# Patient Record
Sex: FEMALE | Race: WHITE | NOT HISPANIC OR LATINO | Employment: FULL TIME | URBAN - METROPOLITAN AREA
[De-identification: names, ages, dates, MRNs, and addresses within clinical notes are randomized per-mention and may not be internally consistent; named-entity substitution may affect disease eponyms.]

---

## 2024-10-16 ENCOUNTER — OFFICE VISIT (OUTPATIENT)
Dept: FAMILY MEDICINE CLINIC | Facility: CLINIC | Age: 34
End: 2024-10-16
Payer: COMMERCIAL

## 2024-10-16 VITALS
HEART RATE: 88 BPM | HEIGHT: 66 IN | DIASTOLIC BLOOD PRESSURE: 80 MMHG | BODY MASS INDEX: 30.53 KG/M2 | OXYGEN SATURATION: 97 % | WEIGHT: 190 LBS | SYSTOLIC BLOOD PRESSURE: 124 MMHG

## 2024-10-16 DIAGNOSIS — F41.9 ANXIETY: ICD-10-CM

## 2024-10-16 DIAGNOSIS — R53.82 CHRONIC FATIGUE: ICD-10-CM

## 2024-10-16 DIAGNOSIS — N92.6 IRREGULAR MENSTRUAL BLEEDING: ICD-10-CM

## 2024-10-16 DIAGNOSIS — E66.09 CLASS 1 OBESITY DUE TO EXCESS CALORIES WITHOUT SERIOUS COMORBIDITY WITH BODY MASS INDEX (BMI) OF 30.0 TO 30.9 IN ADULT: ICD-10-CM

## 2024-10-16 DIAGNOSIS — Z00.00 WELL ADULT EXAM: Primary | ICD-10-CM

## 2024-10-16 DIAGNOSIS — E66.811 CLASS 1 OBESITY DUE TO EXCESS CALORIES WITHOUT SERIOUS COMORBIDITY WITH BODY MASS INDEX (BMI) OF 30.0 TO 30.9 IN ADULT: ICD-10-CM

## 2024-10-16 DIAGNOSIS — Z11.59 ENCOUNTER FOR HEPATITIS C SCREENING TEST FOR LOW RISK PATIENT: ICD-10-CM

## 2024-10-16 PROCEDURE — 99385 PREV VISIT NEW AGE 18-39: CPT | Performed by: FAMILY MEDICINE

## 2024-10-16 PROCEDURE — 99213 OFFICE O/P EST LOW 20 MIN: CPT | Performed by: FAMILY MEDICINE

## 2024-10-16 RX ORDER — HYDROXYZINE PAMOATE 25 MG/1
25 CAPSULE ORAL
COMMUNITY
Start: 2024-09-07

## 2024-10-16 RX ORDER — HYDROXYZINE HYDROCHLORIDE 25 MG/ML
25 INJECTION, SOLUTION INTRAMUSCULAR
COMMUNITY

## 2024-10-16 NOTE — PATIENT INSTRUCTIONS
Recommend 30 minutes of moderate activity daily.  Recommend maintaining a balanced diet.  Goal of 4-6 bottles of water daily; be cautious of liquid calories if you are trying to watch calorie or carb intake.  Specifically alcohol intake.  Goal of limiting carbs to 60 g per meal and up to 20 g per snack; recommend small meal or snack every 3-4 hours.

## 2024-10-16 NOTE — PROGRESS NOTES
"Chief Complaint  Establish Care (Pt would like to speak about weight gain and tiredness)    Subjective      Jacqueline BROWN is a 34 y.o. female who presents to [facility] to establish care    Tobacco:1ppd, patient hesitant to reduce or quit use due to anxiety and weight.  Alcohol: Notes 2 drinks twice a week and occasionally more on the weekends, discussed this and the overall plan for lifestyle changes and weight loss.  Diet: Patient admits that diet is not great but she is planning to work on this.  Exercise: None currently, notes difficulty with motivation and fatigue.  Sexually active/STD screen: Declines screening, in monogamous polite relationship.  Open to hep C screening.  Mood: PHQ 2 score of 1, ROSEANN-7 score of 9.  Patient admits to anxiety for which she takes as needed hydroxyzine, not interested in daily maintenance medicine at this time.  Vaccines: Patient believes her tetanus was updated in the last couple years at outside facility.    Colon cancer screening: Not due to age    Cervical Cancer screening: Past due for screening, would prefer referral to gynecology due to recent spotting midcycle  Breast cancer screening: Not due to age  Osteoporosis screening/monitoring: Not due to age    Additional concerns today include continued weight gain and chronic fatigue.  Patient notes that this adds to her overall mood concerns and lack of motivation.      Objective   Vital Signs:   Vitals:    10/16/24 0916   BP: 124/80   Pulse: 88   SpO2: 97%   Weight: 86.2 kg (190 lb)   Height: 167.6 cm (66\")     Body mass index is 30.67 kg/m².    Wt Readings from Last 3 Encounters:   10/16/24 86.2 kg (190 lb)     BP Readings from Last 3 Encounters:   10/16/24 124/80       Health Maintenance   Topic Date Due    BMI FOLLOWUP  Never done    HEPATITIS C SCREENING  Never done    ANNUAL PHYSICAL  Never done    PAP SMEAR  Never done    Pneumococcal Vaccine 0-64 (1 of 2 - PCV) 10/22/2024 (Originally " 5/5/1994)    TDAP/TD VACCINES (1 - Tdap) 10/22/2024 (Originally 5/5/2007)    COVID-19 Vaccine (1 - 2023-24 season) 01/05/2025 (Originally 9/1/2024)    INFLUENZA VACCINE  03/31/2025 (Originally 8/1/2024)       Physical Exam  Vitals reviewed.   Constitutional:       General: She is not in acute distress.     Appearance: Normal appearance.   HENT:      Head: Normocephalic and atraumatic.      Mouth/Throat:      Mouth: Mucous membranes are moist.   Eyes:      Conjunctiva/sclera: Conjunctivae normal.   Neck:      Thyroid: No thyromegaly.   Cardiovascular:      Rate and Rhythm: Normal rate and regular rhythm.      Heart sounds: Normal heart sounds.   Pulmonary:      Effort: Pulmonary effort is normal.      Breath sounds: Normal breath sounds.   Musculoskeletal:         General: No swelling.      Cervical back: Normal range of motion and neck supple.   Skin:     General: Skin is warm.   Neurological:      General: No focal deficit present.      Mental Status: She is alert.   Psychiatric:         Mood and Affect: Mood normal.         Behavior: Behavior normal.          Result Review :  The following data was reviewed by: Joe Reyes DO on 10/16/2024:      ASSESSMENT/PLAN  Diagnoses and all orders for this visit:    1. Well adult exam (Primary)  -     Ambulatory Referral to Gynecology  -     CBC w AUTO Differential; Future  -     Basic metabolic panel; Future  -     TSH; Future  -     Vitamin D 25 hydroxy; Future  -     Vitamin B12; Future  -     Iron Profile; Future    2. Irregular menstrual bleeding  -     Ambulatory Referral to Gynecology    3. Chronic fatigue  -     CBC w AUTO Differential; Future  -     Basic metabolic panel; Future  -     TSH; Future  -     Vitamin D 25 hydroxy; Future  -     Vitamin B12; Future  -     Iron Profile; Future    4. Class 1 obesity due to excess calories without serious comorbidity with body mass index (BMI) of 30.0 to 30.9 in adult  -     CBC w AUTO Differential; Future  -     Basic  metabolic panel; Future  -     TSH; Future  -     Vitamin D 25 hydroxy; Future  -     Vitamin B12; Future  -     Iron Profile; Future    5. Anxiety    6. Encounter for hepatitis C screening test for low risk patient  -     Hepatitis C antibody; Future        Patient Instructions   Recommend 30 minutes of moderate activity daily.  Recommend maintaining a balanced diet.  Goal of 4-6 bottles of water daily; be cautious of liquid calories if you are trying to watch calorie or carb intake.  Specifically alcohol intake.  Goal of limiting carbs to 60 g per meal and up to 20 g per snack; recommend small meal or snack every 3-4 hours.    BMI is >= 30 and <35. (Class 1 Obesity). The following options were offered after discussion;: weight loss educational material (shared in after visit summary), exercise counseling/recommendations, and nutrition counseling/recommendations       Jacqueline BROWN  reports that she has been smoking cigarettes. She has never used smokeless tobacco. I have educated her on the risk of diseases from using tobacco products such as cancer, COPD, and heart disease.     I advised her to quit and she is not willing to quit.    I spent 3.5 minutes counseling the patient.        FOLLOW UP  Return in about 3 months (around 1/16/2025), or if symptoms worsen or fail to improve please follow up sooner., for Weight recheck, Mood.  Patient was given instructions and counseling regarding her condition or for health maintenance advice. Please see specific information pulled into the AVS if appropriate.       Joe Reyes DO  10/16/24  09:59 EDT

## 2024-10-22 ENCOUNTER — LAB (OUTPATIENT)
Dept: LAB | Facility: HOSPITAL | Age: 34
End: 2024-10-22
Payer: COMMERCIAL

## 2024-10-22 DIAGNOSIS — E66.811 CLASS 1 OBESITY DUE TO EXCESS CALORIES WITHOUT SERIOUS COMORBIDITY WITH BODY MASS INDEX (BMI) OF 30.0 TO 30.9 IN ADULT: ICD-10-CM

## 2024-10-22 DIAGNOSIS — R53.82 CHRONIC FATIGUE: ICD-10-CM

## 2024-10-22 DIAGNOSIS — Z00.00 WELL ADULT EXAM: ICD-10-CM

## 2024-10-22 DIAGNOSIS — E66.09 CLASS 1 OBESITY DUE TO EXCESS CALORIES WITHOUT SERIOUS COMORBIDITY WITH BODY MASS INDEX (BMI) OF 30.0 TO 30.9 IN ADULT: ICD-10-CM

## 2024-10-24 ENCOUNTER — LAB (OUTPATIENT)
Dept: LAB | Facility: HOSPITAL | Age: 34
End: 2024-10-24
Payer: COMMERCIAL

## 2024-10-24 DIAGNOSIS — Z11.59 ENCOUNTER FOR HEPATITIS C SCREENING TEST FOR LOW RISK PATIENT: ICD-10-CM

## 2024-10-24 DIAGNOSIS — R53.82 CHRONIC FATIGUE: ICD-10-CM

## 2024-10-24 DIAGNOSIS — Z00.00 WELL ADULT EXAM: ICD-10-CM

## 2024-10-24 DIAGNOSIS — E66.811 CLASS 1 OBESITY DUE TO EXCESS CALORIES WITHOUT SERIOUS COMORBIDITY WITH BODY MASS INDEX (BMI) OF 30.0 TO 30.9 IN ADULT: ICD-10-CM

## 2024-10-24 DIAGNOSIS — E66.09 CLASS 1 OBESITY DUE TO EXCESS CALORIES WITHOUT SERIOUS COMORBIDITY WITH BODY MASS INDEX (BMI) OF 30.0 TO 30.9 IN ADULT: ICD-10-CM

## 2024-10-24 LAB
25(OH)D3 SERPL-MCNC: 25.2 NG/ML (ref 30–100)
ANION GAP SERPL CALCULATED.3IONS-SCNC: 15 MMOL/L (ref 5–15)
BASOPHILS # BLD AUTO: 0.11 10*3/MM3 (ref 0–0.2)
BASOPHILS NFR BLD AUTO: 1 % (ref 0–1.5)
BUN SERPL-MCNC: 12 MG/DL (ref 6–20)
BUN/CREAT SERPL: 14.3 (ref 7–25)
CALCIUM SPEC-SCNC: 9.8 MG/DL (ref 8.6–10.5)
CHLORIDE SERPL-SCNC: 99 MMOL/L (ref 98–107)
CO2 SERPL-SCNC: 21 MMOL/L (ref 22–29)
CREAT SERPL-MCNC: 0.84 MG/DL (ref 0.57–1)
DEPRECATED RDW RBC AUTO: 41.6 FL (ref 37–54)
EGFRCR SERPLBLD CKD-EPI 2021: 92.5 ML/MIN/1.73
EOSINOPHIL # BLD AUTO: 0.4 10*3/MM3 (ref 0–0.4)
EOSINOPHIL NFR BLD AUTO: 3.7 % (ref 0.3–6.2)
ERYTHROCYTE [DISTWIDTH] IN BLOOD BY AUTOMATED COUNT: 13.9 % (ref 12.3–15.4)
GLUCOSE SERPL-MCNC: 89 MG/DL (ref 65–99)
HCT VFR BLD AUTO: 43.8 % (ref 34–46.6)
HCV AB SER QL: NORMAL
HGB BLD-MCNC: 14.4 G/DL (ref 12–15.9)
IMM GRANULOCYTES # BLD AUTO: 0.04 10*3/MM3 (ref 0–0.05)
IMM GRANULOCYTES NFR BLD AUTO: 0.4 % (ref 0–0.5)
IRON 24H UR-MRATE: 67 MCG/DL (ref 37–145)
IRON SATN MFR SERPL: 12 % (ref 20–50)
LYMPHOCYTES # BLD AUTO: 2.53 10*3/MM3 (ref 0.7–3.1)
LYMPHOCYTES NFR BLD AUTO: 23.5 % (ref 19.6–45.3)
MCH RBC QN AUTO: 27.4 PG (ref 26.6–33)
MCHC RBC AUTO-ENTMCNC: 32.9 G/DL (ref 31.5–35.7)
MCV RBC AUTO: 83.4 FL (ref 79–97)
MONOCYTES # BLD AUTO: 0.67 10*3/MM3 (ref 0.1–0.9)
MONOCYTES NFR BLD AUTO: 6.2 % (ref 5–12)
NEUTROPHILS NFR BLD AUTO: 65.2 % (ref 42.7–76)
NEUTROPHILS NFR BLD AUTO: 7.02 10*3/MM3 (ref 1.7–7)
NRBC BLD AUTO-RTO: 0 /100 WBC (ref 0–0.2)
PLATELET # BLD AUTO: 380 10*3/MM3 (ref 140–450)
PMV BLD AUTO: 10 FL (ref 6–12)
POTASSIUM SERPL-SCNC: 4.2 MMOL/L (ref 3.5–5.2)
RBC # BLD AUTO: 5.25 10*6/MM3 (ref 3.77–5.28)
SODIUM SERPL-SCNC: 135 MMOL/L (ref 136–145)
TIBC SERPL-MCNC: 562 MCG/DL (ref 298–536)
TRANSFERRIN SERPL-MCNC: 377 MG/DL (ref 200–360)
TSH SERPL DL<=0.05 MIU/L-ACNC: 3.03 UIU/ML (ref 0.27–4.2)
VIT B12 BLD-MCNC: 492 PG/ML (ref 211–946)
WBC NRBC COR # BLD AUTO: 10.77 10*3/MM3 (ref 3.4–10.8)

## 2024-10-24 PROCEDURE — 82306 VITAMIN D 25 HYDROXY: CPT

## 2024-10-24 PROCEDURE — 36415 COLL VENOUS BLD VENIPUNCTURE: CPT

## 2024-10-24 PROCEDURE — 85025 COMPLETE CBC W/AUTO DIFF WBC: CPT

## 2024-10-24 PROCEDURE — 84443 ASSAY THYROID STIM HORMONE: CPT

## 2024-10-24 PROCEDURE — 80048 BASIC METABOLIC PNL TOTAL CA: CPT

## 2024-10-24 PROCEDURE — 86803 HEPATITIS C AB TEST: CPT

## 2024-10-24 PROCEDURE — 82607 VITAMIN B-12: CPT

## 2024-10-24 PROCEDURE — 84466 ASSAY OF TRANSFERRIN: CPT

## 2024-10-24 PROCEDURE — 83540 ASSAY OF IRON: CPT

## 2025-01-13 NOTE — PROGRESS NOTES
"Well Woman Visit    CC: Scheduled annual well gyn visit  Chief Complaint   Patient presents with    Gynecologic Exam     wwe       HPI:   34 y.o.   Social History     Substance and Sexual Activity   Sexual Activity Yes    Partners: Male    Birth control/protection: None       Menses:   q 24-25 days, lasts 7 days, changes products q 1 hrs on heaviest days.   Pain with menses:  Mild, OTC meds control discomfort      PCP: does manage PMHx and preventative labs  History: PMHx, Meds, Allergies, PSHx, Social Hx, and POBHx all reviewed and updated.    Pt has no complaints today.    PHYSICAL EXAM:  /79   Pulse 96   Ht 167.6 cm (66\")   Wt 86.6 kg (191 lb)   LMP 2024 (Approximate)   Breastfeeding No   BMI 30.83 kg/m²  Not found.     Exam conducted with a chaperone present  General- NAD, alert and oriented, appropriate  Psych- Normal mood, good memory  Neck- No masses, no thyroid enlargement  CV- Regular rhythm, no murnurs  Resp- CTA to bases, no wheezes  Abdomen- Soft, non distended, non tender, no masses    Breast left-  Bilaterally symmetrical, no masses, non tender, no nipple discharge  Breast right- Bilaterally symmetrical, no masses, non tender, no nipple discharge    External genitalia- Normal female, no lesions  Urethra/meatus- Normal, no masses, non tender  Bladder- Normal, no masses, non tender  Vagina- Normal, no atrophy, no lesions, no discharge.  Prolapse : none noted   Cvx- Normal, no lesions, no discharge, No cervical motion tenderness  Uterus- Normal size, shape & consistency.  Non tender, mobile.  Adnexa- No mass, non tender  Anus/Rectum/Perineum- Not performed    Lymphatic- No palpable neck, axillary, or groin nodes  Ext- No edema, no cyanosis    Skin- No lesions, no rashes, no acanthosis nigricans      ASSESSMENT and PLAN:    Diagnoses and all orders for this visit:    1. Encounter for gynecological examination without abnormal finding (Primary)  -     IgPManjeet " HPV        Preventative:  BREAST HEALTH- Monthly self breast exam importance and how to reviewed. MMG and/or MRI (prn) reviewed per society guidelines and her individual history. Screen: Not medically needed  CERVICAL CANCER Screening- Reviewed current ASCCP guidelines for screening w and wo cotest HPV, age specific.  Screen: Updated today  SEXUAL HEALTH: Declines STD screening  VACCINATIONS Recommended: Covid vaccine, Flu annually.  Importance discussed, risk being unvaccinated reviewed.  Questions answered  SMOKING/VAPING STATUS- pt does use nicotine and/or tobacco or vape.  I reviewed importance of avoiding.  Recommend FU w PCP regarding quitting options if unable to quit wo assistance.  1800-QUIT NOW.  Does not currently qualify (age 50-80, 20pack yr hx, current or former smoker, quit w/in last 15yrs, no symptoms of lung CA) for low dose CT of chest.  If qualifies, I recommend pt FU w PCP to schedule/monitor screening for lung CA        She understands the importance of having any ordered tests to be performed in a timely fashion.  The risks of not performing them include, but are not limited to, advanced cancer stages, bone loss from osteoporosis and/or subsequent increase in morbidity and/or mortality.  She is encouraged to review her results online and/or contact or office if she has questions.     Follow Up:  Return in about 1 year (around 1/14/2026) for Annual physical.        Odilon Stone, APRN  01/14/2025    [facility] [address]  [facility]  [address] [place] [address]  Dept: [phone]  Dept Fax: [phone]  Loc: [phone]

## 2025-01-14 ENCOUNTER — OFFICE VISIT (OUTPATIENT)
Dept: OBSTETRICS AND GYNECOLOGY | Facility: CLINIC | Age: 35
End: 2025-01-14
Payer: COMMERCIAL

## 2025-01-14 VITALS
WEIGHT: 191 LBS | BODY MASS INDEX: 30.7 KG/M2 | DIASTOLIC BLOOD PRESSURE: 79 MMHG | HEIGHT: 66 IN | HEART RATE: 96 BPM | SYSTOLIC BLOOD PRESSURE: 117 MMHG

## 2025-01-14 DIAGNOSIS — Z01.419 ENCOUNTER FOR GYNECOLOGICAL EXAMINATION WITHOUT ABNORMAL FINDING: Primary | ICD-10-CM

## 2025-01-14 PROCEDURE — 99459 PELVIC EXAMINATION: CPT | Performed by: NURSE PRACTITIONER

## 2025-01-14 PROCEDURE — G0123 SCREEN CERV/VAG THIN LAYER: HCPCS | Performed by: NURSE PRACTITIONER

## 2025-01-14 PROCEDURE — 87624 HPV HI-RISK TYP POOLED RSLT: CPT | Performed by: NURSE PRACTITIONER

## 2025-01-14 PROCEDURE — 99385 PREV VISIT NEW AGE 18-39: CPT | Performed by: NURSE PRACTITIONER

## 2025-01-17 ENCOUNTER — OFFICE VISIT (OUTPATIENT)
Dept: FAMILY MEDICINE CLINIC | Facility: CLINIC | Age: 35
End: 2025-01-17
Payer: COMMERCIAL

## 2025-01-17 ENCOUNTER — PATIENT ROUNDING (BHMG ONLY) (OUTPATIENT)
Dept: OBSTETRICS AND GYNECOLOGY | Facility: CLINIC | Age: 35
End: 2025-01-17
Payer: COMMERCIAL

## 2025-01-17 VITALS
OXYGEN SATURATION: 95 % | BODY MASS INDEX: 30.44 KG/M2 | SYSTOLIC BLOOD PRESSURE: 114 MMHG | HEIGHT: 66 IN | WEIGHT: 189.4 LBS | HEART RATE: 85 BPM | DIASTOLIC BLOOD PRESSURE: 67 MMHG

## 2025-01-17 DIAGNOSIS — F41.9 ANXIETY: Primary | ICD-10-CM

## 2025-01-17 DIAGNOSIS — E55.9 VITAMIN D DEFICIENCY: ICD-10-CM

## 2025-01-17 DIAGNOSIS — Z78.9 DAILY CONSUMPTION OF ALCOHOL: ICD-10-CM

## 2025-01-17 LAB
CYTOLOGIST CVX/VAG CYTO: NORMAL
CYTOLOGY CVX/VAG DOC CYTO: NORMAL
CYTOLOGY CVX/VAG DOC THIN PREP: NORMAL
DX ICD CODE: NORMAL
HPV I/H RISK 4 DNA CVX QL PROBE+SIG AMP: NEGATIVE
Lab: NORMAL
OTHER STN SPEC: NORMAL
STAT OF ADQ CVX/VAG CYTO-IMP: NORMAL

## 2025-01-17 PROCEDURE — 99214 OFFICE O/P EST MOD 30 MIN: CPT | Performed by: FAMILY MEDICINE

## 2025-01-17 RX ORDER — FLUOXETINE 10 MG/1
20 CAPSULE ORAL DAILY
Qty: 30 CAPSULE | Refills: 1 | Status: SHIPPED | OUTPATIENT
Start: 2025-01-17

## 2025-01-17 NOTE — PROGRESS NOTES
"Chief Complaint  Follow-up    Subjective     Problem List  Visit Diagnosis   Encounters  Notes  Medications  Labs  Result Review Imaging  Media    Jacqueline BROWN presents to [facility]  History of Present Illness    History of Present Illness         Objective   Vital Signs:   /67 (BP Location: Right arm, Patient Position: Sitting, Cuff Size: Large Adult)   Pulse 85   Ht 167.6 cm (66\")   Wt 85.9 kg (189 lb 6.4 oz)   SpO2 95%   BMI 30.57 kg/m²     Physical Exam     Physical Exam      Result Review :Labs  Result Review  Imaging  Med Tab  Media  Procedures   {The following data was reviewed by (Optional):72474}  {Ambulatory Labs (Optional):85954}  Common labs          10/24/2024    10:53   Common Labs   Glucose 89    BUN 12    Creatinine 0.84    Sodium 135    Potassium 4.2    Chloride 99    Calcium 9.8    WBC 10.77    Hemoglobin 14.4    Hematocrit 43.8    Platelets 380      {Recent Imaging (Optional):78824}  Results for orders placed or performed in visit on 10/24/24   Basic metabolic panel    Collection Time: 10/24/24 10:53 AM    Specimen: Blood   Result Value Ref Range    Glucose 89 65 - 99 mg/dL    BUN 12 6 - 20 mg/dL    Creatinine 0.84 0.57 - 1.00 mg/dL    Sodium 135 (L) 136 - 145 mmol/L    Potassium 4.2 3.5 - 5.2 mmol/L    Chloride 99 98 - 107 mmol/L    CO2 21.0 (L) 22.0 - 29.0 mmol/L    Calcium 9.8 8.6 - 10.5 mg/dL    BUN/Creatinine Ratio 14.3 7.0 - 25.0    Anion Gap 15.0 5.0 - 15.0 mmol/L    eGFR 92.5 >60.0 mL/min/1.73   TSH    Collection Time: 10/24/24 10:53 AM    Specimen: Blood   Result Value Ref Range    TSH 3.030 0.270 - 4.200 uIU/mL   Vitamin D 25 hydroxy    Collection Time: 10/24/24 10:53 AM    Specimen: Blood   Result Value Ref Range    25 Hydroxy, Vitamin D 25.2 (L) 30.0 - 100.0 ng/ml   Vitamin B12    Collection Time: 10/24/24 10:53 AM    Specimen: Blood   Result Value Ref Range    Vitamin B-12 492 211 - 946 pg/mL   Iron Profile    Collection Time: " 10/24/24 10:53 AM    Specimen: Blood   Result Value Ref Range    Iron 67 37 - 145 mcg/dL    Iron Saturation (TSAT) 12 (L) 20 - 50 %    Transferrin 377 (H) 200 - 360 mg/dL    TIBC 562 (H) 298 - 536 mcg/dL   Hepatitis C antibody    Collection Time: 10/24/24 10:53 AM    Specimen: Blood   Result Value Ref Range    Hepatitis C Ab Non-Reactive Non-Reactive       Results        {Data reviewed (Optional):03325:::1}     Procedures        Assessment and Plan CC Problem List  Visit Diagnosis   ROS  Review (Popup)  Health Maintenance  Quality  BestPractice  Medications  SmartSets  SnapShot Encounters  Media   There are no diagnoses linked to this encounter.    Assessment & Plan        {   }  {CD Anticipatory Guidance (Optional):96413}    {Time Spent (Optional):75262}    Follow Up Instructions Charge Capture  Follow-up Communications   No follow-ups on file.  Patient was given instructions and counseling regarding her condition or for health maintenance advice. Please see specific information pulled into the AVS if appropriate.       Transcribed from ambient dictation for Joe Reyes DO by Joe Reyes DO.  01/17/25   12:58 EST    {[employer] Provider Statement:81227}

## 2025-01-17 NOTE — PROGRESS NOTES
"Chief Complaint  Follow-up    Subjective     Problem List  Visit Diagnosis   Encounters  Notes  Medications  Labs  Result Review Imaging  Media    Jacqueline BROWN presents to [facility]  History of Present Illness    History of Present Illness  The patient is a 34-year-old female who presents for a follow-up of mood. She notes concerns with anxiety and was interested in GeneSight testing. She has been on medications in the past, but she does not recall what they were. Previously hesitant to take a daily maintenance medication for mood and just had hydroxyzine as needed but she notes mood is not improving and she is interested in talking about other medication options at this time. We reviewed the pros and cons of GeneSight testing, behavioral health with counseling and psychiatry. At this time, patient has decided to trial a medication and reach out to her insurance to see about coverage of GeneSight testing for the future. Patient is concerned about medications affecting or interacting with her alcohol consumption. She does consume one or two drinks daily and occasionally more on the weekends. A hepatic panel was not previously checked with her baseline labs but given the frequent alcohol use, I will add that to her blood work. She also had a vitamin D deficiency on the recent labs and has not started taking a supplement as previously recommended but we will start that and we will plan to recheck.    SOCIAL HISTORY  The patient consumes one or two drinks daily and occasionally more on the weekends.    MEDICATIONS  Current: hydroxyzine  No thoughts of harm to herself or others.  No past suicidal ideation or attempts.     Objective   Vital Signs:   /67 (BP Location: Right arm, Patient Position: Sitting, Cuff Size: Large Adult)   Pulse 85   Ht 167.6 cm (66\")   Wt 85.9 kg (189 lb 6.4 oz)   SpO2 95%   BMI 30.57 kg/m²     Physical Exam  Vitals reviewed.   Constitutional:  "      General: She is not in acute distress.     Appearance: Normal appearance.   HENT:      Head: Normocephalic and atraumatic.      Mouth/Throat:      Mouth: Mucous membranes are moist.   Eyes:      Conjunctiva/sclera: Conjunctivae normal.   Cardiovascular:      Rate and Rhythm: Normal rate and regular rhythm.      Heart sounds: Normal heart sounds.   Pulmonary:      Effort: Pulmonary effort is normal.      Breath sounds: Normal breath sounds.   Musculoskeletal:         General: No swelling.      Cervical back: Normal range of motion and neck supple.   Skin:     General: Skin is warm.   Neurological:      General: No focal deficit present.      Mental Status: She is alert.   Psychiatric:         Mood and Affect: Mood normal.         Behavior: Behavior normal.          Physical Exam      Result Review :Labs  Result Review  Imaging  Med Tab  Media  Procedures       Common labs          10/24/2024    10:53   Common Labs   Glucose 89    BUN 12    Creatinine 0.84    Sodium 135    Potassium 4.2    Chloride 99    Calcium 9.8    WBC 10.77    Hemoglobin 14.4    Hematocrit 43.8    Platelets 380        Results for orders placed or performed in visit on 01/14/25   IgP, Aptima HPV    Collection Time: 01/14/25  9:03 AM    Specimen: ThinPrep Vial   Result Value Ref Range    Diagnosis Comment     Specimen adequacy: Comment     Clinician Provided ICD-10: Comment     Performed by: Comment     . .     Note: Comment     Method: Comment     HPV Aptima Negative Negative       Results             Procedures        Assessment and Plan CC Problem List  Visit Diagnosis   ROS  Review (Popup)  Health Maintenance  Quality  BestPractice  Medications  SmartSets  SnapShot Encounters  Media   Diagnoses and all orders for this visit:    1. Anxiety (Primary)  -     FLUoxetine (PROzac) 10 MG capsule; Take 2 capsules by mouth Daily.  Dispense: 30 capsule; Refill: 1    2. Daily consumption of alcohol  -     Hepatic Function Panel;  Future    3. Vitamin D deficiency  -     Vitamin D 25 hydroxy; Future      Patient Instructions   Vitamin D deficiency: Recommend taking 1000 to 2000 IU daily and rechecking vitamin D level in the next 2 to 3 months.      Assessment & Plan                  Follow Up Instructions Charge Capture  Follow-up Communications   Return in about 4 weeks (around 2/14/2025) for Call or follow-up in approximately 1 month with an update on how you are doing.  Patient was given instructions and counseling regarding her condition or for health maintenance advice. Please see specific information pulled into the AVS if appropriate.       Transcribed from ambient dictation for Joe Reyes DO by Joe Reyes DO.  01/17/25   13:16 EST

## 2025-01-17 NOTE — PATIENT INSTRUCTIONS
Vitamin D deficiency: Recommend taking 1000 to 2000 IU daily and rechecking vitamin D level in the next 2 to 3 months.

## 2025-02-20 DIAGNOSIS — F41.9 ANXIETY: ICD-10-CM

## 2025-02-20 NOTE — TELEPHONE ENCOUNTER
Caller: KEVIN Jamesvanesa    Relationship: Self    Best call back number: [phone]     Requested Prescriptions:   Requested Prescriptions     Pending Prescriptions Disp Refills    FLUoxetine (PROzac) 10 MG capsule 30 capsule 1     Sig: Take 2 capsules by mouth Daily.        Pharmacy where request should be sent: [facility], [place], [place] - [facility]EL FLETCHER [facility]. - [phone]  - [phone] FX     Last office visit with prescribing clinician: 1/17/2025   Last telemedicine visit with prescribing clinician: Visit date not found   Next office visit with prescribing clinician: Visit date not found     Does the patient have less than a 3 day supply:  [x] Yes  [] No    Would you like a call back once the refill request has been completed: [] Yes [] No    If the office needs to give you a call back, can they leave a voicemail: [] Yes [] No    Ori Linn Rep   02/20/25 14:18 EST

## 2025-02-24 RX ORDER — FLUOXETINE 10 MG/1
20 CAPSULE ORAL DAILY
Qty: 90 CAPSULE | Refills: 1 | Status: SHIPPED | OUTPATIENT
Start: 2025-02-24

## 2025-03-24 DIAGNOSIS — F41.9 ANXIETY: ICD-10-CM

## 2025-03-24 RX ORDER — FLUOXETINE 10 MG/1
20 CAPSULE ORAL DAILY
Qty: 90 CAPSULE | Refills: 1 | Status: SHIPPED | OUTPATIENT
Start: 2025-03-24

## 2025-07-21 DIAGNOSIS — F41.9 ANXIETY: ICD-10-CM

## 2025-07-21 RX ORDER — FLUOXETINE 10 MG/1
20 CAPSULE ORAL DAILY
Qty: 90 CAPSULE | Refills: 1 | Status: SHIPPED | OUTPATIENT
Start: 2025-07-21

## 2025-07-21 NOTE — TELEPHONE ENCOUNTER
Patient has requested a refill for Fluoxetine 10 mg capsule.    Last filled: 03/24/25  Last OV: 01/17/25  Next OV: none